# Patient Record
Sex: FEMALE | Race: WHITE
[De-identification: names, ages, dates, MRNs, and addresses within clinical notes are randomized per-mention and may not be internally consistent; named-entity substitution may affect disease eponyms.]

---

## 2017-04-09 ENCOUNTER — HOSPITAL ENCOUNTER (EMERGENCY)
Dept: HOSPITAL 62 - ER | Age: 67
Discharge: HOME | End: 2017-04-09
Payer: MEDICARE

## 2017-04-09 VITALS — DIASTOLIC BLOOD PRESSURE: 62 MMHG | SYSTOLIC BLOOD PRESSURE: 144 MMHG

## 2017-04-09 DIAGNOSIS — R11.2: ICD-10-CM

## 2017-04-09 DIAGNOSIS — D72.819: ICD-10-CM

## 2017-04-09 DIAGNOSIS — Z87.891: ICD-10-CM

## 2017-04-09 DIAGNOSIS — R10.9: ICD-10-CM

## 2017-04-09 DIAGNOSIS — Z88.5: ICD-10-CM

## 2017-04-09 DIAGNOSIS — N12: ICD-10-CM

## 2017-04-09 DIAGNOSIS — R53.1: ICD-10-CM

## 2017-04-09 DIAGNOSIS — E11.9: ICD-10-CM

## 2017-04-09 DIAGNOSIS — E03.9: Primary | ICD-10-CM

## 2017-04-09 LAB
ALBUMIN SERPL-MCNC: 3.7 G/DL (ref 3.5–5)
ALP SERPL-CCNC: 52 U/L (ref 38–126)
ALT SERPL-CCNC: 45 U/L (ref 9–52)
ANION GAP SERPL CALC-SCNC: 12 MMOL/L (ref 5–19)
APPEARANCE UR: (no result)
AST SERPL-CCNC: 29 U/L (ref 14–36)
BASOPHILS # BLD AUTO: 0 10^3/UL (ref 0–0.2)
BASOPHILS NFR BLD AUTO: 0.4 % (ref 0–2)
BILIRUB DIRECT SERPL-MCNC: 0.2 MG/DL (ref 0–0.4)
BILIRUB SERPL-MCNC: 0.7 MG/DL (ref 0.2–1.3)
BILIRUB UR QL STRIP: NEGATIVE
BUN SERPL-MCNC: 13 MG/DL (ref 7–20)
CALCIUM: 8.8 MG/DL (ref 8.4–10.2)
CHLORIDE SERPL-SCNC: 108 MMOL/L (ref 98–107)
CO2 SERPL-SCNC: 24 MMOL/L (ref 22–30)
CREAT SERPL-MCNC: 0.69 MG/DL (ref 0.52–1.25)
EOSINOPHIL # BLD AUTO: 0 10^3/UL (ref 0–0.6)
EOSINOPHIL NFR BLD AUTO: 0.1 % (ref 0–6)
ERYTHROCYTE [DISTWIDTH] IN BLOOD BY AUTOMATED COUNT: 14.3 % (ref 11.5–14)
GLUCOSE SERPL-MCNC: 169 MG/DL (ref 75–110)
GLUCOSE UR STRIP-MCNC: >=500 MG/DL
HCT VFR BLD CALC: 39.8 % (ref 36–47)
HGB BLD-MCNC: 13.2 G/DL (ref 12–15.5)
HGB HCT DIFFERENCE: -0.2
KETONES UR STRIP-MCNC: NEGATIVE MG/DL
LIPASE SERPL-CCNC: 335.2 U/L (ref 23–300)
LYMPHOCYTES # BLD AUTO: 1.3 10^3/UL (ref 0.5–4.7)
LYMPHOCYTES NFR BLD AUTO: 45.9 % (ref 13–45)
MAGNESIUM SERPL-MCNC: 1.7 MG/DL (ref 1.6–2.3)
MCH RBC QN AUTO: 28.1 PG (ref 27–33.4)
MCHC RBC AUTO-ENTMCNC: 33.2 G/DL (ref 32–36)
MCV RBC AUTO: 85 FL (ref 80–97)
MONOCYTES # BLD AUTO: 0.3 10^3/UL (ref 0.1–1.4)
MONOCYTES NFR BLD AUTO: 10.9 % (ref 3–13)
NEUTROPHILS # BLD AUTO: 1.2 10^3/UL (ref 1.7–8.2)
NEUTS SEG NFR BLD AUTO: 42.7 % (ref 42–78)
NITRITE UR QL STRIP: POSITIVE
PH UR STRIP: 5 [PH] (ref 5–9)
POTASSIUM SERPL-SCNC: 3.8 MMOL/L (ref 3.6–5)
PROT SERPL-MCNC: 6.6 G/DL (ref 6.3–8.2)
PROT UR STRIP-MCNC: 30 MG/DL
RBC # BLD AUTO: 4.7 10^6/UL (ref 3.72–5.28)
SODIUM SERPL-SCNC: 144.2 MMOL/L (ref 137–145)
SP GR UR STRIP: 1.03
TSH SERPL-ACNC: 0.1 UIU/ML (ref 0.47–4.68)
UROBILINOGEN UR-MCNC: NEGATIVE MG/DL (ref ?–2)
WBC # BLD AUTO: 2.9 10^3/UL (ref 4–10.5)

## 2017-04-09 PROCEDURE — S0119 ONDANSETRON 4 MG: HCPCS

## 2017-04-09 PROCEDURE — 83605 ASSAY OF LACTIC ACID: CPT

## 2017-04-09 PROCEDURE — 36415 COLL VENOUS BLD VENIPUNCTURE: CPT

## 2017-04-09 PROCEDURE — 81001 URINALYSIS AUTO W/SCOPE: CPT

## 2017-04-09 PROCEDURE — 80053 COMPREHEN METABOLIC PANEL: CPT

## 2017-04-09 PROCEDURE — 84443 ASSAY THYROID STIM HORMONE: CPT

## 2017-04-09 PROCEDURE — 84439 ASSAY OF FREE THYROXINE: CPT

## 2017-04-09 PROCEDURE — 83690 ASSAY OF LIPASE: CPT

## 2017-04-09 PROCEDURE — 85025 COMPLETE CBC W/AUTO DIFF WBC: CPT

## 2017-04-09 PROCEDURE — 83735 ASSAY OF MAGNESIUM: CPT

## 2017-04-09 PROCEDURE — 99283 EMERGENCY DEPT VISIT LOW MDM: CPT

## 2017-04-09 NOTE — ER DOCUMENT REPORT
ED Medical Screen (RME)





- General


Chief Complaint: General Weakness


Stated Complaint: BACK PAIN


TRAVEL OUTSIDE OF THE [place] IN LAST 30 DAYS: No





- HPI


Patient complains to provider of: generalized weakness


Notes: 





04/09/17 11:57


Generalweakness for a few days nausea vomiting patient has a history of 

hypothyroid did not take her Synthroid a day patient has urinalysis performed 

showing positive nitrates.  Denies fevers patient did get a flu shot this year





- Related Data


Allergies/Adverse Reactions: 


 





morphine Allergy (Verified 04/09/17 11:47)


 











Past Medical History


Renal/ Medical History: Denies: Hx Peritoneal Dialysis





Review of Systems





- Review of Systems


Constitutional: Weakness





Physical Exam





- Vital signs


Vitals: 





 











Temp Pulse Resp BP Pulse Ox


 


 97.7 F   77   16   118/93 H  100 


 


 04/09/17 11:49  04/09/17 11:49  04/09/17 11:49  04/09/17 11:49  04/09/17 11:49














- Cardiovascular


Rhythm: Regular


Heart sounds: Normal auscultation





Course





- Vital Signs


Vital signs: 





 











Temp Pulse Resp BP Pulse Ox


 


 97.7 F   77   16   118/93 H  100 


 


 04/09/17 11:49  04/09/17 11:49  04/09/17 11:49  04/09/17 11:49  04/09/17 11:49

## 2017-04-09 NOTE — ER DOCUMENT REPORT
ED General





- General


Chief Complaint: General Weakness


Stated Complaint: BACK PAIN


Mode of Arrival: Ambulatory


Information source: Patient


Notes: 


67-year-old female presents with complaints of right flank pain generalized 

weakness nausea vomiting and feeling warm over the past few days.  Patient 

denies any specific fever.  Patient was seen by primary care physician noted to 

have a UTI and sent in for further evaluation


TRAVEL OUTSIDE OF THE [place] IN LAST 30 DAYS: No





- HPI


Onset: Yesterday - Three-day duration


Onset/Duration: Persistent


Quality of pain: Achy


Severity: Mild


Pain Level: 1


Associated symptoms: Nausea, Vomiting, Weakness


Exacerbated by: Denies


Relieved by: Denies


Similar symptoms previously: No


Recently seen / treated by doctor: No





- Related Data


Allergies/Adverse Reactions: 


 





morphine Allergy (Verified 17 12:39)


 











Past Medical History





- Social History


Smoking Status: Former Smoker


Cigarette use (# per day): No


Chew tobacco use (# tins/day): No


Smoking Education Provided: No


Frequency of alcohol use: None


Drug Abuse: None


Family History: Reviewed & Not Pertinent


Patient has suicidal ideation: No


Patient has homicidal ideation: No


Endocrine Medical History: Reports: Hx Diabetes Mellitus Type 2


Renal/ Medical History: Denies: Hx Peritoneal Dialysis


Past Surgical History: Reports: Hx  Section - x3, Hx Hysterectomy





Review of Systems





- Review of Systems


Notes: 


REVIEW OF SYSTEMS:


CONSTITUTIONAL :  Denies fever,  chills, or sweats.  Denies recent illness.


EENT:   Denies eye, ear, throat, or mouth pain or symptoms.  Denies nasal or 

sinus congestion or discharge.  Denies throat, tongue, or mouth swelling or 

difficulty swallowing.


CARDIOVASCULAR:  Denies chest pain.  Denies palpitations or racing or irregular 

heart beat.  Denies ankle edema.


RESPIRATORY:  Denies cough, cold, or chest congestion.  Denies shortness of 

breath, difficulty breathing, or wheezing.


GASTROINTESTINAL: Admits to nausea vomiting or flank pain


GENITOURINARY:  Denies difficulty urinating, painful urination, burning, 

frequency, blood in urine, or discharge.


FEMALE  GENITOURINARY:  Denies vaginal bleeding, heavy or abnormal periods, 

irregular periods.  Denies vaginal discharge or odor. 


MUSCULOSKELETAL:  Denies back or neck pain or stiffness.  Denies joint pain or 

swelling.


SKIN:   Denies rash, lesions or sores.


HEMATOLOGIC :   Denies easy bruising or bleeding.


LYMPHATIC:  Denies swollen, enlarged glands.


NEUROLOGICAL: Admits to generalized weakness


PSYCHIATRIC:  Denies anxiety or stress.  Denies depression, suicidal ideation, 

or homicidal ideation.





ALL OTHER SYSTEMS REVIEWED AND NEGATIVE.








Dictation was performed using Dragon voice recognition software 








PHYSICAL EXAMINATION:





GENERAL: Well-appearing, well-nourished and in no acute distress.





HEAD: Atraumatic, normocephalic.





EYES: Pupils equal round and reactive to light, extraocular movements intact, 

conjunctiva are normal.





ENT: Nares patent, oropharynx clear without exudates.  Moist mucous membranes.





NECK: Normal range of motion, supple without lymphadenopathy





LUNGS: Breath sounds clear to auscultation bilaterally and equal.  No wheezes 

rales or rhonchi.





HEART: Regular rate and rhythm without murmurs





ABDOMEN: Soft, nontender, nondistended abdomen.  No guarding, no rebound.  No 

masses appreciated.  Mild right CVA tenderness





Female : deferred





Musculoskeletal: Normal range of motion, no pitting or edema.  No cyanosis.





NEUROLOGICAL: Cranial nerves grossly intact.  Normal speech, normal gait.  

Normal sensory, motor exams





PSYCH: Normal mood, normal affect.





SKIN: Warm, Dry, normal turgor, no rashes or lesions noted.





Physical Exam





- Vital signs


Vitals: 


 











Temp Pulse Resp BP Pulse Ox


 


 97.7 F   77   16   118/93 H  100 


 


 17 11:49  17 11:49  17 11:49  17 11:49  17 11:49














Course





- Re-evaluation


Re-evalutation: 





17 15:11


Urinalysis is consistent with a UTI, patient's laboratory otherwise notes mild 

leukopenia TSH was noted to be low as well.  I believe the patient has 

pyelonephritis looks well.  Patient will be started on antibiotics given nausea 

control and very strict return precautions.  Patient states she understands and 

will do so and is very happy to be discharged











After performing a Medical Screening Examination, I estimate there is LOW risk 

for ACUTE APPENDICITIS, BOWEL OBSTRUCTION, ACUTE CHOLECYSTITIS, PERFORATED 

DIVERTICULITIS, INCARCERATED HERNIA, PANCREATITIS, PELVIC INFLAMMATORY DISEASE, 

PERFORATED ULCER, ECTOPIC PREGNANCY, or TUBO-OVARIAN ABSCESS, thus I consider 

the discharge disposition reasonable. Also, there is no evidence or peritonitis

, sepsis, or toxicity. The patient and I have discussed the diagnosis and risks

, and we agree with discharging home with close follow-up with the 

understanding that symptoms and presentations can change. We also discussed 

returning to the Emergency Department immediately if new or worsening symptoms 

occur. We have discussed the symptoms which are most concerning (e.g., bloody 

stool, fever, changing or worsening pain, vomiting) that necessitate immediate 

return.





- Vital Signs


Vital signs: 


 











Temp Pulse Resp BP Pulse Ox


 


 98.7 F   70   19   144/62 H  94 


 


 17 13:39  17 13:39  17 13:39  17 13:39  17 13:39














- Laboratory


Result Diagrams: 


 17 12:05





 17 12:05


Laboratory results interpreted by me: 


 











  17





  12:05 12:05 12:05


 


WBC  2.9 L  


 


RDW  14.3 H  


 


Plt Count  103 L  


 


Lymphocytes %  45.9 H  


 


Absolute Neutrophils  1.2 L  


 


Chloride   108 H 


 


Glucose   169 H 


 


Lipase   335.2 H 


 


TSH    0.10 L


 


Urine Protein   


 


Urine Glucose (UA)   


 


Urine Nitrite   


 


Ur Leukocyte Esterase   














  17





  12:05


 


WBC 


 


RDW 


 


Plt Count 


 


Lymphocytes % 


 


Absolute Neutrophils 


 


Chloride 


 


Glucose 


 


Lipase 


 


TSH 


 


Urine Protein  30 H


 


Urine Glucose (UA)  >=500 H


 


Urine Nitrite  POSITIVE H


 


Ur Leukocyte Esterase  MODERATE H














Discharge





- Discharge


Clinical Impression: 


 Pyelonephritis, Weakness, Secondary hypothyroidism





Condition: Stable


Disposition: HOME, SELF-CARE


Instructions:  Pyelonephritis ([facility])


Prescriptions: 


Ciprofloxacin HCl [Cipro 500 mg Tablet] 500 mg PO BID #20 tablet


Ondansetron HCl [Zofran] 8 mg PO Q6 #14 tablet


Referrals: 


UMER VAZQUEZ DO [Primary Care Provider] - Follow up tomorrow

## 2017-05-11 ENCOUNTER — HOSPITAL ENCOUNTER (OUTPATIENT)
Dept: HOSPITAL 62 - RAD | Age: 67
End: 2017-05-11
Payer: MEDICARE

## 2017-05-11 DIAGNOSIS — I25.10: Primary | ICD-10-CM

## 2017-05-11 PROCEDURE — A9500 TC99M SESTAMIBI: HCPCS

## 2017-05-11 PROCEDURE — 93017 CV STRESS TEST TRACING ONLY: CPT

## 2017-05-11 PROCEDURE — 78452 HT MUSCLE IMAGE SPECT MULT: CPT

## 2017-05-11 NOTE — NON-INVASIVE CARDIOLOGY (SQ)
INTRAVENOUS LEXISCAN CARDIOLITE STRESS TEST USING SINGLE PHOTON EMMISION 
COMPUTERIZED TOMOGRAPHIC.



DATE OF PROCEDURE: May 11, 2017



INDICATION : Coronary artery disease



CARDIAC RISK FACTORS: Diabetes, hypertension, dyslipidemia, family history of 
CAD



RESTING EKG: Sinus rhythm without any baseline ST-T wave changes



STRESS EKG: No significant changes noted with LexiScan bolus 



REASON FOR TERMINATION: Protocol.





PROCEDURE REPORT: Baseline heart rate 70 beats per minute with blood pressure 
of 127/66.  Patient had no significant complaints.  Heart rate at 2 minutes 
post bolus 79 with a blood pressure of 120/47.  3 minutes post bolus heart rate 
76 with blood pressure of 126/49.  No significant EKG changes were noted.  
Patient had no significant complaints during the procedure or postprocedure. 

Patient injected with Aminophyllin 75 mg at 3 minutes or later after Lexiscan 
bolus.



CONCLUSIONS: Normal EKG and hemodynamic response to IV LexiScan.







NUCLEAR DATA:

At rest the patient was given 13.88 millicuries of technetium 99 sestamibi 
injected intravenously.  As per protocol rest gated SPECT images were obtained.
  Subsequently the patient was given intravenous LexiScan at a dose of 0.4 mg 
in 5 mL intravenously, followed by flush with normal saline.  Subsequently the 
stress dose of 41.6 millicuries of technetium 99 sestamibi was injected 
intravenously.  As per protocol stress gated images were obtained.  



NUCLEAR INTERPRETATION: Both raw and processed data were used for 
interpretation.  Visual, qualitative, computer-generated quantitative data was 
used.  There was good myocardial uptake of technetium compound.  Motion 
artifact and soft tissue attenuations were noted.  Increased visceral uptake 
was noted.  Mild decreased uptake noted in the LV apex felt to be related to 
normal apical thinning.  Cannot rule out a small area of prior mild apical scar 
but no corresponding wall motion abnormalities were noted No definitive areas 
of transient perfusion defect noted.  No definitive areas of fixed perfusion 
defect or scars noted.



EKG gated imaging showed LV EF at 52 %, rest and stress gated EF similar 
visually.  T. I D. ratio was 1.01.  Lung heart ratio noted to be within normal 
limits 0.34.  No significant extracardiac and abnormal radiotracer activities 
were noted.  RV free wall uptake was noted to be borderline increased.



IMPRESSION: Also refer to comments under nuclear interpretation. Also test 
results needs to be interpreted in the context of pretest probability.





1.  There is no definitive scintigraphic evidence of LexiScan induced 
myocardial ischemia.

2.  There is no definitive scintigraphic evidence of myocardial infarction/scar.

3.  EKG gated imaging shows left ejection fraction of approximately 52 %. 

4.  Clinical correlation requested as occasionally single vessel disease or 
balanced ischemia could be missed. In approximately 10% of the cases Lexiscan 
may not cause adequate vasodilatory stress.



RECOMMENDATIONS:

Aggressive risk factor modification, medical therapy.

Clinical correlation with echocardiogram derived ejection fraction.

Inability to exercise by itself can lead to increased cardiovascular event 
risks.











Amanda Villar M.D., [facility]

Consultant cardiologist,

Board certified in cardiovascular diseases, 

Nuclear cardiology, 

Echocardiography

Cardiac CT and cardiac MRI

Ph. [phone] 

Fax [phone]
[facility]

## 2017-08-06 ENCOUNTER — HOSPITAL ENCOUNTER (EMERGENCY)
Dept: HOSPITAL 62 - ER | Age: 67
Discharge: HOME | End: 2017-08-06
Payer: MEDICARE

## 2017-08-06 VITALS — SYSTOLIC BLOOD PRESSURE: 174 MMHG | DIASTOLIC BLOOD PRESSURE: 88 MMHG

## 2017-08-06 DIAGNOSIS — K02.9: ICD-10-CM

## 2017-08-06 DIAGNOSIS — I10: ICD-10-CM

## 2017-08-06 DIAGNOSIS — E11.9: ICD-10-CM

## 2017-08-06 DIAGNOSIS — R22.0: ICD-10-CM

## 2017-08-06 DIAGNOSIS — K04.7: Primary | ICD-10-CM

## 2017-08-06 DIAGNOSIS — I25.10: ICD-10-CM

## 2017-08-06 DIAGNOSIS — Z88.5: ICD-10-CM

## 2017-08-06 PROCEDURE — 99283 EMERGENCY DEPT VISIT LOW MDM: CPT

## 2017-08-06 PROCEDURE — 0H91XZZ DRAINAGE OF FACE SKIN, EXTERNAL APPROACH: ICD-10-PCS | Performed by: EMERGENCY MEDICINE

## 2017-08-06 PROCEDURE — 10060 I&D ABSCESS SIMPLE/SINGLE: CPT

## 2017-08-06 NOTE — ER DOCUMENT REPORT
ED General





- General


Chief Complaint: Facial Swelling


Stated Complaint: JAW PAIN


Time Seen by Provider: 17 10:09


Mode of Arrival: Ambulatory


Information source: Patient


Notes: 


67-year-old female history of diabetes coronary artery disease very poor 

dentition presents with complaints of left facial swelling.  Patient notes 

symptoms started over the past 3 days.  Patient notes her tooth was hurting on 

the left.  Denies a history of previous abscesses.  Patient denies any fevers 

or chills, difficulty breathing 


TRAVEL OUTSIDE OF THE [place] IN LAST 30 DAYS: No





- HPI


Onset: Last week


Onset/Duration: Persistent, Worse


Quality of pain: Achy


Severity: Moderate


Pain Level: 2


Associated symptoms: Other


Exacerbated by: Food


Relieved by: Denies


Similar symptoms previously: No


Recently seen / treated by doctor: No





- Related Data


Allergies/Adverse Reactions: 


 





morphine Allergy (Verified 17 10:08)


 











Past Medical History





- Social History


Smoking Status: Never Smoker


Cigarette use (# per day): No


Chew tobacco use (# tins/day): No


Smoking Education Provided: No


Frequency of alcohol use: None


Drug Abuse: None


Family History: Reviewed & Not Pertinent


Patient has suicidal ideation: No


Patient has homicidal ideation: No





- Past Medical History


Cardiac Medical History: Reports: Hx Hypertension


Endocrine Medical History: Reports: Hx Diabetes Mellitus Type 2


Renal/ Medical History: Denies: Hx Peritoneal Dialysis


Past Surgical History: Reports: Hx  Section - x3, Hx Hysterectomy





Review of Systems





- Review of Systems


Notes: 


REVIEW OF SYSTEMS:


CONSTITUTIONAL :  Denies fever,  chills, or sweats.  Denies recent illness.


EENT:   Admits to dental pain facial swelling


CARDIOVASCULAR:  Denies chest pain.  Denies palpitations or racing or irregular 

heart beat.  Denies ankle edema.


RESPIRATORY:  Denies cough, cold, or chest congestion.  Denies shortness of 

breath, difficulty breathing, or wheezing.


GASTROINTESTINAL:  Denies abdominal pain or distention.  Denies nausea, vomiting

, or diarrhea.  Denies blood in vomitus, stools, or per rectum.  Denies black, 

tarry stools.  Denies constipation. 


GENITOURINARY:  Denies difficulty urinating, painful urination, burning, 

frequency, blood in urine, or discharge.


FEMALE  GENITOURINARY:  Denies vaginal bleeding, heavy or abnormal periods, 

irregular periods.  Denies vaginal discharge or odor. 


MUSCULOSKELETAL:  Denies back or neck pain or stiffness.  Denies joint pain or 

swelling.


SKIN:   Denies rash, lesions or sores.


HEMATOLOGIC :   Denies easy bruising or bleeding.


LYMPHATIC:  Denies swollen, enlarged glands.


NEUROLOGICAL:  Denies confusion or altered mental status.  Denies passing out 

or loss of consciousness.  Denies dizziness or lightheadedness.  Denies 

headache.  Denies weakness or paralysis or loss of use of either side.  Denies 

problems with gait or speech.  Denies sensory loss, numbness, or tingling.  

Denies seizures.


PSYCHIATRIC:  Denies anxiety or stress.  Denies depression, suicidal ideation, 

or homicidal ideation.





ALL OTHER SYSTEMS REVIEWED AND NEGATIVE.











PHYSICAL EXAMINATION:





GENERAL: Well-appearing, well-nourished and in no acute distress.





HEAD: Facial edema noted tender fluctuant just lateral to tooth number 19





EYES: Pupils equal round and reactive to light, extraocular movements intact, 

conjunctiva are normal.





ENT: left facial edema, poor dentition gum around missing tooth 19 swollen





NECK: Normal range of motion, supple without lymphadenopathy





LUNGS: Breath sounds clear to auscultation bilaterally and equal.  No wheezes 

rales or rhonchi.





HEART: Regular rate and rhythm without murmurs





ABDOMEN: Soft, nontender, nondistended abdomen.  No guarding, no rebound.  No 

masses appreciated.





Female : deferred





Musculoskeletal: Normal range of motion, no pitting or edema.  No cyanosis.





NEUROLOGICAL: Cranial nerves grossly intact.  Normal speech, normal gait.  

Normal sensory, motor exams





PSYCH: Normal mood, normal affect.





SKIN: Warm, Dry, normal turgor, no rashes or lesions noted.

















Dictation was performed using Dragon voice recognition software





Physical Exam





- Vital signs


Vitals: 


 











Temp Pulse Resp BP Pulse Ox


 


 98 F   60   16   186/64 H  97 


 


 17 10:04  17 10:04  17 10:04  17 10:04  17 10:04














Course





- Re-evaluation


Re-evalutation: 


17 10:29


pt has obvious swelling around an infected decayed tooth. 





17 10:54


area incise, drained, no complications. pt placed cammie ntibtiocs given very 

strict return precautaitons


no signs of airway compromise, eros angina at this time 








After performing a Medical Screening Examination, I estimate there is LOW risk 

for a DEEP SPACE INFECTION (e.g., EROS'S ANGINA OR RETROPHARYNGEAL ABSCESS), 

MENINGITIS, INTRACRANIAL HEMORRHAGE, or AIRWAY COMPROMISE, thus I consider the 

discharge disposition reasonable. Also, there is no evidence or peritonitis, 

sepsis, or toxicity.  I have reevaluated this patient multiple times and no 

significant life threatening changes are noted. The patient and I have 

discussed the diagnosis and risks, and we agree with discharging home with 

close follow-up with the understanding that symptoms and presentations can 

change. We also discussed returning to the Emergency Department immediately if 

new or worsening symptoms occur. We have discussed the symptoms which are most 

concerning (e.g., changing or worsening pain, trouble swallowing or breathing, 

neck stiffness or fever) that necessitate immediate return.





- Vital Signs


Vital signs: 


 











Temp Pulse Resp BP Pulse Ox


 


 98 F   60   16   186/64 H  97 


 


 17 10:04  17 10:04  17 10:04  17 10:04  17 10:04














Procedures





- Incision and Drainage


  ** Left Lower Face


Time completed: 10:50


Type: Simple


Anesthetic type: 0.5% Bupivacaine


mL's of anesthetic: 10


Blade size: 11


I&D procedure: Sterile dressing applied


Incision Method: Incision made by scalpel


Amount/type of drainage: small pus with large blood 





- Additional Procedures


  ** inferior alveolar nerve block


Time performed: 10:45 - using 10cc of 0.5% bupivicaine iwth ocmplete releif no 

complication 





Discharge





- Discharge


Clinical Impression: 


 Dental abscess, Facial swelling





Condition: Stable


Disposition: HOME, SELF-CARE


Instructions:  Abscess ([facility]), Post Incision and Drainage


Additional Instructions: 


Follow up with your physician tomorrow for further care or return to the ED 

IMMEDIATELY if symptoms worsen or new concerns occur. If you cannot afford to 

follow up with your primary care physician a list of low cost clinics have been 

provided at the end of your discharge papers as well.


Prescriptions: 


Clindamycin HCl 300 mg PO Q6 #40 capsule

## 2018-02-28 ENCOUNTER — HOSPITAL ENCOUNTER (EMERGENCY)
Dept: HOSPITAL 62 - ER | Age: 68
Discharge: OTHER INSTITUTION | End: 2018-02-28
Payer: MEDICARE

## 2018-02-28 VITALS — SYSTOLIC BLOOD PRESSURE: 149 MMHG | DIASTOLIC BLOOD PRESSURE: 68 MMHG

## 2018-02-28 DIAGNOSIS — M79.651: ICD-10-CM

## 2018-02-28 DIAGNOSIS — M54.5: ICD-10-CM

## 2018-02-28 DIAGNOSIS — M46.1: Primary | ICD-10-CM

## 2018-02-28 DIAGNOSIS — M54.9: ICD-10-CM

## 2018-02-28 LAB
APPEARANCE UR: CLEAR
APTT PPP: YELLOW S
BILIRUB UR QL STRIP: NEGATIVE
GLUCOSE UR STRIP-MCNC: >=500 MG/DL
KETONES UR STRIP-MCNC: (no result) MG/DL
NITRITE UR QL STRIP: NEGATIVE
PH UR STRIP: 5 [PH] (ref 5–9)
PROT UR STRIP-MCNC: 100 MG/DL
SP GR UR STRIP: 1.02
UROBILINOGEN UR-MCNC: NEGATIVE MG/DL (ref ?–2)

## 2018-02-28 PROCEDURE — 96372 THER/PROPH/DIAG INJ SC/IM: CPT

## 2018-02-28 PROCEDURE — 99284 EMERGENCY DEPT VISIT MOD MDM: CPT

## 2018-02-28 PROCEDURE — 72110 X-RAY EXAM L-2 SPINE 4/>VWS: CPT

## 2018-02-28 PROCEDURE — 81001 URINALYSIS AUTO W/SCOPE: CPT

## 2018-02-28 NOTE — RADIOLOGY REPORT (SQ)
EXAM DESCRIPTION:  L SPINE WHOLE



COMPLETED DATE/TIME:  2/28/2018 7:42 am



REASON FOR STUDY:  Back pain



COMPARISON:  None.



NUMBER OF VIEWS:  Five views including obliques.



TECHNIQUE:  AP, lateral, oblique, and sacral radiographic images acquired of the lumbar spine.



LIMITATIONS:  None.



FINDINGS:  MINERALIZATION: Normal.

SEGMENTATION: Normal.  No transitional anatomy.

ALIGNMENT: Normal.

VERTEBRAE: Maintained height.  No fracture or worrisome bone lesion.

DISCS: Mild disc space loss of height at L5-S1

POSTERIOR ELEMENTS: Mild bilateral facet joint space narrowing and bony spurring at L5-S1.

HARDWARE: None in the spine.

PARASPINAL SOFT TISSUES: Normal.

PELVIS: Not included in the field of view.  SI joints are unremarkable.

OTHER: No other significant finding.



IMPRESSION:  Mild degenerative changes lower lumbar spine



TECHNICAL DOCUMENTATION:  JOB ID:  [id number]

 [employer]- All Rights Reserved



Reading location - IP/workstation name: [email or web address]

## 2018-02-28 NOTE — ER DOCUMENT REPORT
ED General





- General


Chief Complaint: Back Pain


Stated Complaint: LOW BACK PAIN


Time Seen by Provider: 18 07:12


Mode of Arrival: Ambulatory


Information source: Patient


Notes: 





68 years old female presents today with pain over the lower back for the last 

few days more so this morning.  The pain is centered mostly on the right lower 

back but also in the left lower back.  More intense pain on the right lower 

back and radiates to the middle part of the thigh.  Had no difficulty in 

walking.  Denies any numbness tingling sensation or weakness over the lower 

extremities.  No injuries.  Was not lifting any heavy objects.  Did not have 

similar symptoms before.  Denies any abdominal pain nausea vomiting diarrhea 

dysuria or frequency.  Denies any flank pain.  Denies any other constitutional 

symptoms.


TRAVEL OUTSIDE OF THE [place] IN LAST 30 DAYS: No





- HPI


Onset: Yesterday


Onset/Duration: Gradual


Quality of pain: Achy, Sharp.  denies: No pain, Burning, Cramping, Dull, 

Fullness, Pressure, Stabbing, Throbbing, Other


Associated symptoms: denies: None, Allergy/hay fever, Body/muscle aches, Chest 

pain, Chills, Nonproductive cough, Productive cough, Diarrhea, Drooling, Earache

, Fever, Headache, Hoarseness, Hurts to breath, Leg swelling, Nausea, Vomiting, 

Rhinnorhea, Sinus pain/drainage, Shortness of breath, Slow to respond, Sore 

throat, Sweating, Weakness, Other


Exacerbated by: Movement, Walking.  denies: Denies, Supine, Sitting, Standing, 

Coughing, Deep breathing, Food, Other


Relieved by: denies: Denies, Supine, Sitting, Standing, Remaining still, 

Antacids, Food, Other





- Related Data


Allergies/Adverse Reactions: 


 





morphine Allergy (Verified 17 10:08)


 











Past Medical History





- General


Information source: Parent





- Social History


Smoking Status: Never Smoker


Chew tobacco use (# tins/day): No


Frequency of alcohol use: None


Drug Abuse: None


Family History: Reviewed & Not Pertinent


Patient has suicidal ideation: No


Patient has homicidal ideation: No





- Past Medical History


Cardiac Medical History: Reports: Hx Hypertension


Endocrine Medical History: Reports: Hx Diabetes Mellitus Type 2


Renal/ Medical History: Denies: Hx Peritoneal Dialysis


Past Surgical History: Reports: Hx  Section - x3, Hx Hysterectomy





Review of Systems





- Review of Systems


Constitutional: denies: No symptoms reported, See HPI, Chills, Diaphoresis, 

Fever, Malaise, Weakness, Other, Weight gain, Weight loss, Recent illness


EENT: denies: No symptoms reported, See HPI, Eye pain, Eye discharge, Blurred 

vision, Tearing, Double vision, Ear pain, Ear discharge, Nose pain, Nose 

congestion, Nose discharge, Sinus pressure, Sinus discharge, Throat pain, 

Difficulty swallowing, Throat swelling, Mouth pain, Mouth swelling, Dental 

problem, Vertigo, Other


Cardiovascular: denies: No symptoms reported, See HPI, Chest pain, Palpitations

, Heart racing, Orthopnea, Dyspnea, Syncope, Dizziness, Lightheaded, Edema, 

Other, Paroxysmal Nocturnal Dysp


Respiratory: denies: No symptoms reported, See HPI, Cough, Hurts to breathe, 

Hemoptysis, Short of breath, Sputum, Stridor, Wheezing, Other


Gastrointestinal: denies: No symptoms reported, See HPI, Abdomen distended, 

Abdominal pain, Diarrhea, Nausea, Vomiting, Constipation, Blood streaked bowels

, Poor appetite, Poor fluid intake, Blood in vomit, Black stools, Rectal 

bleeding, Last bowel movement, Fecal incontinence, Other


Genitourinary: denies: No symptoms reported, See HPI, Burning, Dysuria, 

Discharge, Frequency, Flank pain, Hematuria, Incontinence, Pain, Urgency, 

Retention, Other


Female Genitourinary: denies: No symptoms reported, See HPI, Last menstrual 

period, Pregnant, Post menopausal, Heavy/abnormal periods, Irregular period, 

Vaginal bleeding, Vaginal discharge, Vaginal odor, Painful intercourse, Other


Musculoskeletal: Back pain


Skin: denies: No symptoms reported, See HPI, Change in color, Change in hair/

nails, Dryness, Lesions, Lumps, Rash, Other


Hematologic/Lymphatic: denies: No symptoms reported, See HPI, Anemia, Blood 

clots, Easy bleeding, Easy bruising, Enlarged lymph nodes, Swollen glands, Other


Neurological/Psychological: denies: No symptoms reported, See HPI, Confusion, 

Dementia, Depression, Hallucinations, Anxiety, Homicidal ideation, Sensory 

change, Weakness, Gait changes, Loss of power, Paralysis, Seizure, Lost 

consciousness, Headaches, Speech impairment, Numbness, Suicidal ideation, 

Tingling, Tremor, Other





Physical Exam





- Notes


Notes: 





PHYSICAL EXAMINATION:





GENERAL: Well-appearing, well-nourished and mild to moderate discomfort, obesity





HEAD: Atraumatic, normocephalic.





EYES: Pupils equal round and reactive to light, extraocular movements intact, 

conjunctiva are normal.





ENT: Nares patent, oropharynx clear without exudates.  Moist mucous membranes.





NECK: Normal range of motion, supple without lymphadenopathy





LUNGS: Breath sounds clear to auscultation bilaterally and equal.  No wheezes 

rales or rhonchi.





HEART: Regular rate and rhythm without murmurs





ABDOMEN: Soft, nontender, nondistended abdomen.  No guarding, no rebound.  No 

masses appreciated.





Female : deferred





Musculoskeletal: Right lower back at the L5-S1 region slight tenderness were 

noted, gets more tender over the right sacroiliac joint region.  And also 

moderate tenderness over the left sacroiliac joint region.  Neurovascular 

function distally was within normal limits.





NEUROLOGICAL: Cranial nerves grossly intact.  Normal speech, normal gait.  

Normal sensory, motor exams





PSYCH: Normal mood, normal affect.





SKIN: Warm, Dry, normal turgor, no rashes or lesions noted.





Course





- Re-evaluation


Re-evalutation: 





18 08:26


Was given Toradol IM as well as Valium, with clinical improvement discharge home


18 08:26








- Laboratory


Laboratory results interpreted by me: 


 











  18





  07:00


 


Urine Protein  100 H


 


Urine Glucose (UA)  >=500 H


 


Urine Ketones  TRACE H














Discharge





- Discharge


Clinical Impression: 


 Bilateral sacroiliitis





Lower back pain


Qualifiers:


 Chronicity: acute Back pain laterality: unspecified Sciatica presence: without 

sciatica Qualified Code(s): M54.5 - Low back pain





Condition: Fair


Disposition: ADMITTED AS INPATIENT


Instructions:  Oral Narcotic Medication ([facility]), Low Back Pain ([facility])


Prescriptions: 


Hydrocodone/Acetaminophen [Vicodin 5-300 mg Tablet] 1 each PO Q6HP PRN #20 

tablet


 PRN Reason: 


Ketorolac Tromethamine [Toradol 10 mg Tablet] 10 mg PO Q6HP PRN #20 tablet


 PRN Reason: 


Diazepam [Valium 2 mg Tablet] 2 mg PO BID PRN #14 tablet


 PRN Reason: 


Referrals: 


UMER VAZQUEZ,  [Primary Care Provider] - Follow up as needed

## 2020-11-16 ENCOUNTER — HOSPITAL ENCOUNTER (OUTPATIENT)
Dept: HOSPITAL 62 - ER | Age: 70
Setting detail: OBSERVATION
LOS: 2 days | Discharge: HOME | End: 2020-11-18
Attending: NURSE PRACTITIONER | Admitting: STUDENT IN AN ORGANIZED HEALTH CARE EDUCATION/TRAINING PROGRAM
Payer: MEDICARE

## 2020-11-16 DIAGNOSIS — Z79.899: ICD-10-CM

## 2020-11-16 DIAGNOSIS — R11.2: ICD-10-CM

## 2020-11-16 DIAGNOSIS — E11.65: ICD-10-CM

## 2020-11-16 DIAGNOSIS — Z88.8: ICD-10-CM

## 2020-11-16 DIAGNOSIS — I25.10: ICD-10-CM

## 2020-11-16 DIAGNOSIS — J12.89: ICD-10-CM

## 2020-11-16 DIAGNOSIS — Z79.4: ICD-10-CM

## 2020-11-16 DIAGNOSIS — J96.01: ICD-10-CM

## 2020-11-16 DIAGNOSIS — E66.9: ICD-10-CM

## 2020-11-16 DIAGNOSIS — I10: ICD-10-CM

## 2020-11-16 DIAGNOSIS — U07.1: Primary | ICD-10-CM

## 2020-11-16 LAB
ADD MANUAL DIFF: NO
ALBUMIN SERPL-MCNC: 3.5 G/DL (ref 3.5–5)
ALP SERPL-CCNC: 61 U/L (ref 38–126)
ANION GAP SERPL CALC-SCNC: 11 MMOL/L (ref 5–19)
AST SERPL-CCNC: 17 U/L (ref 14–36)
BASOPHILS # BLD AUTO: 0 10^3/UL (ref 0–0.2)
BASOPHILS NFR BLD AUTO: 0.1 % (ref 0–2)
BILIRUB DIRECT SERPL-MCNC: 0 MG/DL (ref 0–0.4)
BILIRUB SERPL-MCNC: 1.2 MG/DL (ref 0.2–1.3)
BUN SERPL-MCNC: 16 MG/DL (ref 7–20)
CALCIUM: 9.4 MG/DL (ref 8.4–10.2)
CHLORIDE SERPL-SCNC: 99 MMOL/L (ref 98–107)
CO2 SERPL-SCNC: 26 MMOL/L (ref 22–30)
EOSINOPHIL # BLD AUTO: 0.1 10^3/UL (ref 0–0.6)
EOSINOPHIL NFR BLD AUTO: 1.4 % (ref 0–6)
ERYTHROCYTE [DISTWIDTH] IN BLOOD BY AUTOMATED COUNT: 13.9 % (ref 11.5–14)
GLUCOSE SERPL-MCNC: 193 MG/DL (ref 75–110)
HCT VFR BLD CALC: 39.2 % (ref 36–47)
HGB BLD-MCNC: 13.4 G/DL (ref 12–15.5)
LYMPHOCYTES # BLD AUTO: 1.9 10^3/UL (ref 0.5–4.7)
LYMPHOCYTES NFR BLD AUTO: 22.9 % (ref 13–45)
MCH RBC QN AUTO: 28.1 PG (ref 27–33.4)
MCHC RBC AUTO-ENTMCNC: 34.1 G/DL (ref 32–36)
MCV RBC AUTO: 82 FL (ref 80–97)
MONOCYTES # BLD AUTO: 0.6 10^3/UL (ref 0.1–1.4)
MONOCYTES NFR BLD AUTO: 7.6 % (ref 3–13)
NEUTROPHILS # BLD AUTO: 5.6 10^3/UL (ref 1.7–8.2)
NEUTS SEG NFR BLD AUTO: 68 % (ref 42–78)
PLATELET # BLD: 324 10^3/UL (ref 150–450)
POTASSIUM SERPL-SCNC: 3.6 MMOL/L (ref 3.6–5)
PROT SERPL-MCNC: 6.8 G/DL (ref 6.3–8.2)
RBC # BLD AUTO: 4.77 10^6/UL (ref 3.72–5.28)
TOTAL CELLS COUNTED % (AUTO): 100 %
WBC # BLD AUTO: 8.3 10^3/UL (ref 4–10.5)

## 2020-11-16 PROCEDURE — 81001 URINALYSIS AUTO W/SCOPE: CPT

## 2020-11-16 PROCEDURE — 96375 TX/PRO/DX INJ NEW DRUG ADDON: CPT

## 2020-11-16 PROCEDURE — 80048 BASIC METABOLIC PNL TOTAL CA: CPT

## 2020-11-16 PROCEDURE — 86140 C-REACTIVE PROTEIN: CPT

## 2020-11-16 PROCEDURE — 85025 COMPLETE CBC W/AUTO DIFF WBC: CPT

## 2020-11-16 PROCEDURE — 80053 COMPREHEN METABOLIC PANEL: CPT

## 2020-11-16 PROCEDURE — 36415 COLL VENOUS BLD VENIPUNCTURE: CPT

## 2020-11-16 PROCEDURE — 82803 BLOOD GASES ANY COMBINATION: CPT

## 2020-11-16 PROCEDURE — 85027 COMPLETE CBC AUTOMATED: CPT

## 2020-11-16 PROCEDURE — 71045 X-RAY EXAM CHEST 1 VIEW: CPT

## 2020-11-16 PROCEDURE — G0378 HOSPITAL OBSERVATION PER HR: HCPCS

## 2020-11-16 PROCEDURE — 93010 ELECTROCARDIOGRAM REPORT: CPT

## 2020-11-16 PROCEDURE — 93005 ELECTROCARDIOGRAM TRACING: CPT

## 2020-11-16 PROCEDURE — 96365 THER/PROPH/DIAG IV INF INIT: CPT

## 2020-11-16 PROCEDURE — 99285 EMERGENCY DEPT VISIT HI MDM: CPT

## 2020-11-16 PROCEDURE — 87040 BLOOD CULTURE FOR BACTERIA: CPT

## 2020-11-16 PROCEDURE — 82728 ASSAY OF FERRITIN: CPT

## 2020-11-16 PROCEDURE — 83615 LACTATE (LD) (LDH) ENZYME: CPT

## 2020-11-16 PROCEDURE — 82550 ASSAY OF CK (CPK): CPT

## 2020-11-16 PROCEDURE — 96366 THER/PROPH/DIAG IV INF ADDON: CPT

## 2020-11-16 PROCEDURE — 82962 GLUCOSE BLOOD TEST: CPT

## 2020-11-16 PROCEDURE — 85379 FIBRIN DEGRADATION QUANT: CPT

## 2020-11-16 PROCEDURE — 84484 ASSAY OF TROPONIN QUANT: CPT

## 2020-11-16 NOTE — ER DOCUMENT REPORT
ED Medical Screen (RME)





- General


Chief Complaint: Nausea/Vomiting


Stated Complaint: NAUSEA/VOMITING


Time Seen by Provider: 20 19:43


Primary Care Provider: 


UMER VAZQUEZ DO [Primary Care Provider] - Follow up as needed


Mode of Arrival: Wheelchair


Information source: Patient


Notes: 





70-year-old female presented to ED for complaint of fatigue cough and vomiting 

when she coughs real hard.  She was tested positive for Covid on 3 November.  

She does have nausea medicine that she takes she takes Zofran.  Her son states 

that he was hesitant to bring her out here because she already has the nausea 

medicine and she knows she is Covid positive but he was convinced by other 

family members that she needed to come in to be checked out.  We will get blood 

and urine chest x-ray and she will be seen by another provider.  Cervical put 

back where you are and there get all the stuff going oka

















I have greeted and performed a rapid initial assessment of this patient.  A 

comprehensive ED assessment and evaluation of the patient, analysis of test 

results and completion of medical decision making process will be conducted by 

an additional ED providers.


TRAVEL OUTSIDE OF THE [place] IN LAST 30 DAYS: No





- Related Data


Allergies/Adverse Reactions: 


                                        





morphine Allergy (Verified 20 19:41)


   











Past Medical History





- Past Medical History


Cardiac Medical History: Reports: Hx Hypertension


Endocrine Medical History: Reports: Hx Diabetes Mellitus Type 2


Renal/ Medical History: Denies: Hx Peritoneal Dialysis


Past Surgical History: Reports: Hx  Section - x3, Hx Hysterectomy





Physical Exam





- Vital signs


Vitals: 





                                        











Temp Pulse Resp BP Pulse Ox


 


 98.1 F   80   18   132/64 H  93 


 


 20 18:41  20 18:41  20 18:41  20 18:41  20 18:41














Course





- Vital Signs


Vital signs: 





                                        











Temp Pulse Resp BP Pulse Ox


 


 98.1 F   80   18   132/64 H  93 


 


 20 18:41  20 18:41  20 18:41  20 18:41  20 18:41














Doctor's Discharge





- Discharge


Referrals: 


UMER VAZQUEZ DO [Primary Care Provider] - Follow up as needed

## 2020-11-16 NOTE — RADIOLOGY REPORT (SQ)
EXAM DESCRIPTION:

Site:  CHEST SINGLE VIEW

RP: XR CHEST 1 VIEW 



CLINICAL HISTORY:

70 years  Female;  cough positive for covid on 11/3/20;



COMPARISON: None.



FINDINGS:

Lungs: There are scattered groundglass densities throughout both

lungs. No significant focal consolidation. No pneumothorax or

pleural effusion.

Mediastinum: Mediastinum is within normal limits for this

positioning.

Bones: Bony structures are unremarkable.



IMPRESSION:

1.  Scattered bilateral infiltrates, typical for viral pneumonia

## 2020-11-17 LAB
APPEARANCE UR: (no result)
APTT PPP: (no result) S
ARTERIAL BLOOD FIO2: (no result)
ARTERIAL BLOOD H2CO3: 1.07 MMOL/L (ref 1.05–1.35)
ARTERIAL BLOOD HCO3: 23 MMOL/L (ref 20–24)
ARTERIAL BLOOD PCO2: 35.4 MMHG (ref 35–45)
ARTERIAL BLOOD PH: 7.43 (ref 7.35–7.45)
ARTERIAL BLOOD PO2: 76.6 MMHG (ref 80–100)
ARTERIAL BLOOD TOTAL CO2: 24.1 MMOL/L (ref 21–25)
BASE EXCESS BLDA CALC-SCNC: -0.9 MMOL/L
BILIRUB UR QL STRIP: NEGATIVE
CK SERPL-CCNC: 52 U/L (ref 30–135)
CRP SERPL-MCNC: 15.2 MG/L (ref ?–10)
FERRITIN SERPL-MCNC: 154 NG/ML (ref 11.1–264)
GLUCOSE UR STRIP-MCNC: >=500 MG/DL
KETONES UR STRIP-MCNC: NEGATIVE MG/DL
NITRITE UR QL STRIP: NEGATIVE
PH UR STRIP: 5 [PH] (ref 5–9)
PROT UR STRIP-MCNC: 100 MG/DL
SAO2 % BLDA: 95.7 % (ref 94–98)
SP GR UR STRIP: 1.02
UROBILINOGEN UR-MCNC: NEGATIVE MG/DL (ref ?–2)

## 2020-11-17 RX ADMIN — FAMOTIDINE SCH MG: 20 TABLET, FILM COATED ORAL at 10:42

## 2020-11-17 RX ADMIN — ENOXAPARIN SODIUM SCH MG: 40 INJECTION SUBCUTANEOUS at 10:43

## 2020-11-17 RX ADMIN — DEXAMETHASONE SODIUM PHOSPHATE PRN MG: 10 INJECTION INTRAMUSCULAR; INTRAVENOUS at 02:29

## 2020-11-17 RX ADMIN — OXYCODONE HYDROCHLORIDE AND ACETAMINOPHEN SCH MG: 500 TABLET ORAL at 18:03

## 2020-11-17 RX ADMIN — Medication SCH ML: at 21:47

## 2020-11-17 RX ADMIN — INSULIN HUMAN SCH UNIT: 100 INJECTION, SOLUTION PARENTERAL at 13:01

## 2020-11-17 RX ADMIN — FAMOTIDINE SCH MG: 20 TABLET, FILM COATED ORAL at 21:46

## 2020-11-17 RX ADMIN — Medication SCH MG: at 10:42

## 2020-11-17 RX ADMIN — INSULIN HUMAN SCH UNIT: 100 INJECTION, SOLUTION PARENTERAL at 21:47

## 2020-11-17 RX ADMIN — DEXAMETHASONE SODIUM PHOSPHATE PRN MG: 10 INJECTION INTRAMUSCULAR; INTRAVENOUS at 04:47

## 2020-11-17 RX ADMIN — Medication SCH: at 14:46

## 2020-11-17 RX ADMIN — INSULIN HUMAN SCH UNIT: 100 INJECTION, SOLUTION PARENTERAL at 08:15

## 2020-11-17 RX ADMIN — VITAMIN D, TAB 1000IU (100/BT) SCH UNIT: 25 TAB at 10:42

## 2020-11-17 RX ADMIN — OXYCODONE HYDROCHLORIDE AND ACETAMINOPHEN SCH MG: 500 TABLET ORAL at 10:42

## 2020-11-17 RX ADMIN — INSULIN HUMAN SCH UNIT: 100 INJECTION, SOLUTION PARENTERAL at 18:03

## 2020-11-17 RX ADMIN — Medication SCH: at 06:35

## 2020-11-17 RX ADMIN — INSULIN GLARGINE SCH UNIT: 100 INJECTION, SOLUTION SUBCUTANEOUS at 10:43

## 2020-11-17 RX ADMIN — DEXAMETHASONE SODIUM PHOSPHATE PRN MG: 10 INJECTION INTRAMUSCULAR; INTRAVENOUS at 13:02

## 2020-11-17 RX ADMIN — METOPROLOL TARTRATE SCH MG: 25 TABLET, FILM COATED ORAL at 21:46

## 2020-11-17 NOTE — PDOC H&P
History of Present Illness


Admission Date/PCP: 


  20 05:19





  BA SCHULTZ





Patient complains of: Nausea and vomiting, cough


History of Present Illness: 


ELIZABETH VALENTINO is a 70 year old female with a history of type 2 diabetes on

insulin, hypertension and CAD who was diagnosed with COVID-19 about 2 weeks back

now presents with worsening of symptoms.  She states that she and her  

were diagnosed around the same time after coming in contact with her son and 

grandkids where also positive.  Her  had mild symptoms but she continues 

to have nausea and repeated vomiting.  She states that she has lost her sense of

taste and smell.  She also has a dry cough and body aches but denies any fever, 

chest pain or shortness of breath even with exertion.  She reports that she has 

been unable to eat anything over the past couple of weeks and has lost 

significant weight of about 20 pounds in the last 2 weeks.  She was given 

unspecified medication for the nausea and vomiting by her primary care doctor 

but was not helpful.  On arrival patient was saturating mid 80s on room air and 

was placed on intranasal oxygen at 2 L which improved her saturation to mid 90s.








Past Medical History


Cardiac Medical History: Reports: Hypertension


Endocrine Medical History: Reports: Diabetes Mellitus Type 2





Past Surgical History


Past Surgical History: Reports:  Section - x3, Hysterectomy





Social History


Information Source: Patient


Lives with: Family


Smoking Status: Former Smoker


Hx Recreational Drug Use: No


Drugs: None





- Advance Directive


Resuscitation Status: Full Code





Family History


Family History: Reviewed & Not Pertinent


Parental Family History Reviewed: Yes


Children Family History Reviewed: Yes


Sibling(s) Family History Reviewed.: Yes





Medication/Allergy


Home Medications: 








Ciprofloxacin HCl [Cipro 500 mg Tablet] 500 mg PO BID #20 tablet 17 


Ondansetron HCl [Zofran] 8 mg PO Q6 #14 tablet 17 


Clindamycin HCl 300 mg PO Q6 #40 capsule 17 


Diazepam [Valium 2 mg Tablet] 2 mg PO BID PRN #14 tablet 18 


Hydrocodone/Acetaminophen [Vicodin 5-300 mg Tablet] 1 each PO Q6HP PRN #20 

tablet 18 


Ketorolac Tromethamine [Toradol 10 mg Tablet] 10 mg PO Q6HP PRN #20 tablet 

18 








Allergies/Adverse Reactions: 


                                        





morphine Allergy (Verified 20 19:41)


   











Review of Systems


Constitutional: PRESENT: fatigue, weakness, weight loss


Eyes: ABSENT: visual disturbances


Ears: ABSENT: hearing changes


Nose, Mouth, and Throat: PRESENT: vertigo.  ABSENT: as per HPI, headache(s), 

mouth pain, sore throat


Cardiovascular: ABSENT: chest pain, dyspnea on exertion, edema, orthropnea, 

palpitations


Respiratory: PRESENT: as per HPI


Gastrointestinal: PRESENT: as per HPI


Genitourinary: ABSENT: dysuria, hematuria


Musculoskeletal: ABSENT: joint swelling


Integumentary: ABSENT: rash, wounds


Neurological: ABSENT: abnormal gait, abnormal speech, confusion, dizziness, 

focal weakness, syncope


Psychiatric: ABSENT: anxiety, depression, homidical ideation, suicidal ideation


Endocrine: ABSENT: cold intolerance, heat intolerance, polydipsia, polyuria


Hematologic/Lymphatic: ABSENT: easy bleeding, easy bruising





Physical Exam


Vital Signs: 


                                        











Temp Pulse Resp BP Pulse Ox


 


 98.1 F   80   22 H  148/52 H  94 


 


 20 18:41  20 18:41  20 01:30  20 01:30  20 01:30








                                 Intake & Output











 11/15/20 11/16/20 11/17/20





 06:59 06:59 06:59


 


Intake Total   1007


 


Balance   1007


 


Weight   85 kg











Additional comments: 





GENERAL APPEARANCE: Alert and oriented x3, in no acute distress, breathing 

comfortably on 2 L intranasal oxygen


HEENT: Normocephalic and atraumatic. No scleral icterus.  Moist oral mucosa


NECK: Supple. No lymphadenopathy or tenderness. No carotid bruit. No JVD


CHEST: Symmetric. Nontender to palpation.


LUNGS: Clear with good air entry bilaterally. No wheezing or crackles 


HEART: Regular rate and rhythm with normal S1 and S2. No murmurs, gallops, or 

rubs.


ABDOMEN: soft, active bowel sounds, no direct or rebound tenderness.  No 

organomegaly detected.  


EXTREMITIES: No cyanosis, clubbing, or edema.


MUSCULOSKELETAL: No deformity, atrophy or swelling noted


PSYCHIATRIC:  Recent and remote memory is intact. Appropriate mood and affect.


SKIN: Warm, dry, and well perfused. No lesions or rashes are noted. 


NEUROLOGIC: No focal sensory or motor deficits are noted. 








Results


Laboratory Results: 


                                        





                                 20 23:00 





                                 20 23:00 





                                        











  20





  23:00 23:00 00:20


 


WBC  8.3  


 


RBC  4.77  


 


Hgb  13.4  


 


Hct  39.2  


 


MCV  82  


 


MCH  28.1  


 


MCHC  34.1  


 


RDW  13.9  


 


Plt Count  324  


 


Seg Neutrophils %  68.0  


 


Carbonic Acid   


 


HCO3/H2CO3 Ratio   


 


ABG pH   


 


ABG pCO2   


 


ABG pO2   


 


ABG HCO3   


 


ABG O2 Saturation   


 


ABG Base Excess   


 


FiO2   


 


Sodium   135.9 L 


 


Potassium   3.6 


 


Chloride   99 


 


Carbon Dioxide   26 


 


Anion Gap   11 


 


BUN   16 


 


Creatinine   1.05 


 


Est GFR ( Amer)   > 60 


 


Glucose   193 H 


 


Calcium   9.4 


 


Total Bilirubin   1.2 


 


AST   17 


 


Alkaline Phosphatase   61 


 


Total Protein   6.8 


 


Albumin   3.5 


 


Urine Color    DARK YELLOW


 


Urine Appearance    CLOUDY


 


Urine pH    5.0


 


Ur Specific Gravity    1.024


 


Urine Protein    100 H


 


Urine Glucose (UA)    >=500 H


 


Urine Ketones    NEGATIVE


 


Urine Blood    NEGATIVE


 


Urine Nitrite    NEGATIVE


 


Ur Leukocyte Esterase    MODERATE H


 


Urine WBC (Auto)    156


 


Urine RBC (Auto)    6














  20





  03:30


 


WBC 


 


RBC 


 


Hgb 


 


Hct 


 


MCV 


 


MCH 


 


MCHC 


 


RDW 


 


Plt Count 


 


Seg Neutrophils % 


 


Carbonic Acid  1.07


 


HCO3/H2CO3 Ratio  21:1


 


ABG pH  7.43


 


ABG pCO2  35.4


 


ABG pO2  76.6 L


 


ABG HCO3  23.0


 


ABG O2 Saturation  95.7


 


ABG Base Excess  -0.9


 


FiO2  2L


 


Sodium 


 


Potassium 


 


Chloride 


 


Carbon Dioxide 


 


Anion Gap 


 


BUN 


 


Creatinine 


 


Est GFR (African Amer) 


 


Glucose 


 


Calcium 


 


Total Bilirubin 


 


AST 


 


Alkaline Phosphatase 


 


Total Protein 


 


Albumin 


 


Urine Color 


 


Urine Appearance 


 


Urine pH 


 


Ur Specific Gravity 


 


Urine Protein 


 


Urine Glucose (UA) 


 


Urine Ketones 


 


Urine Blood 


 


Urine Nitrite 


 


Ur Leukocyte Esterase 


 


Urine WBC (Auto) 


 


Urine RBC (Auto) 








                                        











  20





  23:00


 


Troponin I  < 0.012











Impressions: 


                                        





Chest X-Ray  20 19:44


IMPRESSION:


1.  Scattered bilateral infiltrates, typical for viral pneumonia


 














Assessment and Plan





- Diagnosis


(1) Acute respiratory failure with hypoxia


Is this a current diagnosis for this admission?: Yes   


Plan: 


Patient was diagnosed with COVID-19 two weeks back


Denies shortness of breath but oxygen saturation on arrival was mid 80s


ABG: pH/PCO2/PO2=> 7.4 3/35/76 while on 2 L oxygen


Chest x-ray shows scattered bilateral infiltrates suggestive of viral pneumonia


Continue supplemental oxygen through intranasal cannula to keep saturation above

94%


Started on dexamethasone, vitamin D, vitamin C, zinc


Zofran 4 mg every 6 hourly as needed IV for nausea and vomiting


Consider remdesivir and convalescent plasma


Currently doing well on intranasal oxygen, will continue to closely monitor her 

respiratory parameters








(2) Pneumonia due to COVID-19 virus


Is this a current diagnosis for this admission?: Yes   


Plan: 


Patient diagnosed with COVID-19 2 weeks back


Now presents with dry cough and intractable nausea and vomiting


Chest x-ray shows bilateral scattered infiltrates concerning for viral pneumonia


Continue intranasal oxygen, dexamethasone, vitamin D, zinc, vitamin C








(3) Type 2 diabetes mellitus with hyperglycemia


Qualifiers: 


   Diabetes mellitus long term insulin use: with long term use   Qualified Co

de(s): E11.65 - Type 2 diabetes mellitus with hyperglycemia; Z79.4 - Long term 

(current) use of insulin   


Is this a current diagnosis for this admission?: Yes   


Plan: 


Started on Lantus 20 units daily


Sliding scale insulin, Accu-Chek, hypoglycemia protocol








(4) Hypertension


Is this a current diagnosis for this admission?: Yes   


Plan: 


Stable and BP at target


We will hold hypertensive medication for now due to double nausea vomiting and 

possible volume depletion








(5) CAD (coronary artery disease)


Is this a current diagnosis for this admission?: Yes   


Plan: 


Currently chest pain-free


Continue home medications








(6) Obesity (BMI 30.0-34.9)


Is this a current diagnosis for this admission?: Yes   





- Time


Time Spent with patient: 35 or more minutes


Total Critical Time (Minutes): 45


Medications reviewed and adjusted accordingly: Yes


Anticipated Discharge Disposition: Home, Self Care


Anticipated Discharge Timeframe: within 48 hours





- Inpatient Certification


Based on my medical assessment, after consideration of the patient's 

comorbidities, presenting symptoms, or acuity I expect that the services needed 

warrant INPATIENT care.: Yes


I certify that my determination is in accordance with my understanding of 

Medicare's requirements for reasonable and necessary INPATIENT services [42 CFR 

412.3e].: Yes


Medical Necessity: Failure to Improve With Outpatient Therapy, Need Close 

Monitoring Due to Risk of Patient Decompensation, Risk of Complication if Not 

Cared For in Hospital


Post Hospital Care: D/C or Transfer Summary

## 2020-11-17 NOTE — EKG REPORT
SEVERITY:- ABNORMAL ECG -

SINUS RHYTHM

FIRST DEGREE AV BLOCK

CONSIDER ANTERIOR INFARCT

NONSPECIFIC T ABNORMALITIES, DIFFUSE LEADS

BORDERLINE PROLONGED QT INTERVAL

:

Confirmed by: Holli Woods MD 17-Nov-2020 15:20:13

## 2020-11-17 NOTE — PROGRESS NOTE
Provider Note


Provider Note: 


ELIZABETH VALENTINO is a 70 year old female with a history of type 2 diabetes on

insulin, hypertension and CAD who was admitted by the [facility] early this 

morning for COVID 19 w/ intractable nausea and vomiting.





Nursing notes, vital signs, laboratory results, imaging, H&P, and orders 

reviewed.  Agree with plan of care as established by the previous provider.


Patient was briefly seen on morning rounds.  She has no new questions or 

concerns.  No significant changes to clinical status from what was documented in

H&P.





In addition, the patient's home medications have been reconciled and resumed.


As the patient is maintaining oxygen saturations on room air and appears 

clinically stable, will transition from IV dexamethasone to oral in anticipation

of a quick turnaround to discharge.


We will encourage pulmonary toilet with incentive spirometer and flutter valve.


The patient states that she lives alone; we will asked discharge planning to 

evaluate.  Patient will likely benefit from a home health nursing, aide, and 

.

## 2020-11-17 NOTE — ER DOCUMENT REPORT
ED General





- General


Chief Complaint: Nausea/Vomiting


Stated Complaint: NAUSEA/VOMITING


Time Seen by Provider: 20 19:43


Mode of Arrival: Wheelchair


Notes: 


Patient is a 70-year-old female that comes emergency department for chief 

complaint of cough, weakness, dry heaving.  Patient was tested for COVID-19 on 

November 3 and she was positive, she states that for the past 2 weeks she has 

been extremely rundown.  She states that her daughter came over today and 

noticed that she did not look well and told her to come to the hospital to be 

evaluated.  Patient denies shortness of breath, chest pain, she states she dry 

heaves today but did not vomit specifically, denies abnormal bowel movements.  

She denies sore throat, headache.  She does state that she is extremely weak and

this is her chief complaint at this time.  She has a past medical history of 

type 2 diabetes, hypertension, hyperlipidemia, CAD with stents.  She denies 

smoking history, COPD, asthma.











TRAVEL OUTSIDE OF THE [place] IN LAST 30 DAYS: No





- Related Data


Allergies/Adverse Reactions: 


                                        





morphine Allergy (Verified 20 19:41)


   











Past Medical History





- General


Information source: Patient





- Social History


Smoking Status: Former Smoker


Frequency of alcohol use: None


Drug Abuse: None


Lives with: Alone


Family History: Reviewed & Not Pertinent





- Past Medical History


Cardiac Medical History: Reports: Hx Coronary Artery Disease, Hx Hypertension


Endocrine Medical History: Reports: Hx Diabetes Mellitus Type 2


Renal/ Medical History: Denies: Hx Peritoneal Dialysis


Past Surgical History: Reports: Hx Cardiac Catheterization - With stent, Hx 

 Section - x3, Hx Hysterectomy





Review of Systems





- Review of Systems


Constitutional: See HPI


EENT: No symptoms reported


Cardiovascular: No symptoms reported


Respiratory: No symptoms reported


Gastrointestinal: See HPI


Genitourinary: No symptoms reported


Female Genitourinary: No symptoms reported


Musculoskeletal: No symptoms reported


Skin: No symptoms reported


Hematologic/Lymphatic: No symptoms reported


Neurological/Psychological: No symptoms reported





Physical Exam





- Vital signs


Vitals: 


                                        











Temp Pulse Resp BP Pulse Ox


 


 98.1 F   80   18   132/64 H  93 


 


 20 18:41  20 18:41  20 18:41  20 18:41  20 18:41














- Notes


Notes: 





GENERAL: Patient is awake and alert, she is interactive, however she is tired 

and somewhat ill-appearing


HEAD: Normocephalic, atraumatic.


EYES: Pupils equal, round, and reactive to light. Extraocular movements intact.


ENT: Oral mucosa dry, tongue midline. Oropharynx unremarkable. Airway patent. 


NECK: Full range of motion. Supple. Trachea midline. No lymphadenopathy.  No 

nuchal rigidity


LUNGS: Scattered rales noted throughout both general lung fields.  Speaks in 

full sentences.  No tachypnea or respiratory distress.  Occasional congested 

cough.


HEART: Regular rate and rhythm. No murmur


ABDOMEN: Soft, non-tender. Non-distended. 


EXTREMITIES: Moves all 4 extremities spontaneously. No edema, normal radial and 

dorsalis pedis pulses bilaterally. No cyanosis.


BACK: no cervical, thoracic, lumbar midline tenderness. No saddle anesthesia, 

normal distal neurovascular exam. Moves all extremities in full range of motion.


NEUROLOGICAL: Alert and oriented x3. Normal speech. Cranial nerves II through 

XII grossly intact. Strength 5/5 in all extremities. 


PSYCH: Normal affect, normal mood.


SKIN: Warm, dry, normal turgor. No rashes or lesions noted.





Course





- Re-evaluation


Re-evalutation: 


Patient is hypoxic on room air and was placed on 2 L.  She has rales on 

evaluation of her lungs in both lung fields although she is not in respiratory 

distress.  Her abdomen is soft and benign.  Her physical examination is 

otherwise unremarkable.  She is not hypotensive or tachycardic.





CBC unremarkable, chemistry unremarkable, troponin is negative.  Chest x-ray sh

owing viral pneumonia bilaterally.  There is a long delay in obtaining ABG, this

is somewhat nonspecific.  Patient still noted to be hypoxic at rest.  Patient 

stating she would like to go home, we obtained urine, this was noted to be 

infected, I have already initiated treatment for suspected Covid pneumonia with 

dexamethasone, Rocephin, azithromycin.  Cultures pending.  I had recommended 

admission.  Patient was still unsure, she attempted to ambulate with pulse 

oxygen saturation, she had very labored breathing, was barely able to ambulate, 

and had hypoxia into the upper 80s.  She is 88% at rest without nasal cannula.  

At this point patient change her mind, states she is in full agreement with 

admission.  Will discuss with hospitalist.





Discussed with Dr. Gonzalez, patient accepted to medical floor full admission.





- Vital Signs


Vital signs: 


                                        











Temp Pulse Resp BP Pulse Ox


 


 98.1 F   80   22 H  148/52 H  94 


 


 20 18:41  20 18:41  20 01:30  20 01:30  20 01:30














- Laboratory


Result Diagrams: 


                                 20 23:00





                                 20 23:00


Laboratory results interpreted by me: 


                                        











  20





  23:00 00:20 03:30


 


ABG pO2    76.6 L


 


Sodium  135.9 L  


 


Est GFR (MDRD) Non-Af  52 L  


 


Glucose  193 H  


 


Urine Protein   100 H 


 


Urine Glucose (UA)   >=500 H 


 


Ur Leukocyte Esterase   MODERATE H 














- EKG Interpretation by Me


Additional EKG results interpreted by me: 


EKG shows sinus rhythm at a rate of 64, first-degree AV block with NH interval 

of 224, QTc slightly prolonged at 508, borderline T wave inversions in inferior 

leads, no ST segment changes in consecutive leads





Discharge





- Discharge


Clinical Impression: 


 Pneumonia due to COVID-19 virus, Hypoxia, Weakness





Urinary tract infection


Qualifiers:


 Urinary tract infection type: site unspecified Hematuria presence: without 

hematuria Qualified Code(s): N39.0 - Urinary tract infection, site not specified





Condition: Stable


Disposition: ADMITTED AS INPATIENT


Admitting Provider: 


Unit Admitted: Medical Floor

## 2020-11-18 VITALS — SYSTOLIC BLOOD PRESSURE: 139 MMHG | DIASTOLIC BLOOD PRESSURE: 44 MMHG

## 2020-11-18 LAB
ANION GAP SERPL CALC-SCNC: 12 MMOL/L (ref 5–19)
BUN SERPL-MCNC: 21 MG/DL (ref 7–20)
CALCIUM: 9.3 MG/DL (ref 8.4–10.2)
CHLORIDE SERPL-SCNC: 104 MMOL/L (ref 98–107)
CO2 SERPL-SCNC: 20 MMOL/L (ref 22–30)
ERYTHROCYTE [DISTWIDTH] IN BLOOD BY AUTOMATED COUNT: 13.9 % (ref 11.5–14)
GLUCOSE SERPL-MCNC: 336 MG/DL (ref 75–110)
HCT VFR BLD CALC: 36.1 % (ref 36–47)
HGB BLD-MCNC: 12.1 G/DL (ref 12–15.5)
MCH RBC QN AUTO: 27.5 PG (ref 27–33.4)
MCHC RBC AUTO-ENTMCNC: 33.5 G/DL (ref 32–36)
MCV RBC AUTO: 82 FL (ref 80–97)
PLATELET # BLD: 274 10^3/UL (ref 150–450)
POTASSIUM SERPL-SCNC: 4 MMOL/L (ref 3.6–5)
RBC # BLD AUTO: 4.4 10^6/UL (ref 3.72–5.28)
WBC # BLD AUTO: 9.8 10^3/UL (ref 4–10.5)

## 2020-11-18 RX ADMIN — Medication SCH MG: at 10:17

## 2020-11-18 RX ADMIN — Medication SCH ML: at 06:26

## 2020-11-18 RX ADMIN — INSULIN HUMAN SCH UNIT: 100 INJECTION, SOLUTION PARENTERAL at 08:25

## 2020-11-18 RX ADMIN — OXYCODONE HYDROCHLORIDE AND ACETAMINOPHEN SCH MG: 500 TABLET ORAL at 10:17

## 2020-11-18 RX ADMIN — Medication SCH ML: at 13:44

## 2020-11-18 RX ADMIN — INSULIN HUMAN SCH UNIT: 100 INJECTION, SOLUTION PARENTERAL at 12:11

## 2020-11-18 RX ADMIN — FAMOTIDINE SCH MG: 20 TABLET, FILM COATED ORAL at 10:17

## 2020-11-18 RX ADMIN — INSULIN GLARGINE SCH UNIT: 100 INJECTION, SOLUTION SUBCUTANEOUS at 10:22

## 2020-11-18 RX ADMIN — METOPROLOL TARTRATE SCH: 25 TABLET, FILM COATED ORAL at 10:19

## 2020-11-18 RX ADMIN — VITAMIN D, TAB 1000IU (100/BT) SCH UNIT: 25 TAB at 10:15

## 2020-11-18 RX ADMIN — ENOXAPARIN SODIUM SCH MG: 40 INJECTION SUBCUTANEOUS at 10:19

## 2020-11-18 NOTE — PDOC DISCHARGE SUMMARY
Impression





- Admit/DC Date/PCP


Admission Date/Primary Care Provider: 


  11/17/20 05:19





  BA SCHULTZ





Discharge Date: 11/18/20





- Discharge Diagnosis


(1) Acute respiratory failure with hypoxia


Is this a current diagnosis for this admission?: Yes   





(2) CAD (coronary artery disease)


Is this a current diagnosis for this admission?: Yes   





(3) Hypertension


Is this a current diagnosis for this admission?: Yes   





(4) Obesity (BMI 30.0-34.9)


Is this a current diagnosis for this admission?: Yes   





(5) Pneumonia due to COVID-19 virus


Is this a current diagnosis for this admission?: Yes   





(6) Type 2 diabetes mellitus with hyperglycemia


Is this a current diagnosis for this admission?: Yes   





- Additional Information


Resuscitation Status: Full Code


Discharge Diet: Diabetic


Discharge Activity: Activity As Tolerated, Balance Activity w/Rest, Slowly 

Increase Activity


[facility]errals: 


[facility] - 11/30/20 8:30 am (Dr. Andrade)


Prescriptions: 


Albuterol Sulfate [Albuterol Sulfate Hfa] 2 puff IH Q4HP PRN #1 hfa.aer.ad


 PRN Reason: 


Guaifenesin [Mucus Relief ER] 600 mg PO Q12 #14 tab.er.12h


Ascorbic Acid [Vitamin C 500 mg Tablet] 500 mg PO BID #60 tablet


Cholecalciferol (Vitamin D3) [Vitamin D3 1000 Unit Tablet] 1,000 unit PO DAILY 

#60 tablet


Zinc Sulfate [Zinc-220 Capsule] 220 mg PO DAILY #30 capsule


Home Medications: 








Atorvastatin Calcium [Lipitor 80 mg Tablet] 80 mg PO QHS 11/17/20 


Dapagliflozin Propanediol [Farxiga] 10 mg PO DAILY 11/17/20 


Glipizide [Glipizide Xl] 2.5 mg PO BIDBS 11/17/20 


Insulin NPH Human Isophane [Novolin N Flexpen] 20 unit SUBCUT QPM 11/17/20 


Insulin NPH Human Isophane [Novolin N Flexpen] 40 unit SQ QAM 11/17/20 


Levothyroxine Sodium 137 mcg PO Q6AM 11/17/20 


Lisinopril [Prinivil 10 mg Tablet] 30 mg PO DAILY 11/17/20 


Metformin HCl [Metformin HCl ER] 750 mg PO BID 11/17/20 


Metoprolol Tartrate [Lopressor 25 mg Tablet] 25 mg PO Q12 11/17/20 


Albuterol Sulfate [Albuterol Sulfate Hfa] 2 puff IH Q4HP PRN #1 hfa.aer.ad 

11/18/20 


Ascorbic Acid [Vitamin C 500 mg Tablet] 500 mg PO BID #60 tablet 11/18/20 


Cholecalciferol (Vitamin D3) [Vitamin D3 1000 Unit Tablet] 1,000 unit PO DAILY 

#60 tablet 11/18/20 


Guaifenesin [Mucus Relief ER] 600 mg PO Q12 #14 tab.er.12h 11/18/20 


Zinc Sulfate [Zinc-220 Capsule] 220 mg PO DAILY #30 capsule 11/18/20 











History of Present Illiness


History of Present Illness: 


Per H&P by Dr. Gonzalez: ELIZABETH VALENTINO is a 70 year old female with a 

history of type 2 diabetes on insulin, hypertension and CAD who was diagnosed 

with COVID-19 about 2 weeks back now presents with worsening of symptoms.  She s

tates that she and her  were diagnosed around the same time after coming 

in contact with her son and grandkids where also positive.  Her  had mild

symptoms but she continues to have nausea and repeated vomiting.  She states 

that she has lost her sense of taste and smell.  She also has a dry cough and 

body aches but denies any fever, chest pain or shortness of breath even with 

exertion.  She reports that she has been unable to eat anything over the past 

couple of weeks and has lost significant weight of about 20 pounds in the last 2

weeks.  She was given unspecified medication for the nausea and vomiting by her 

primary care doctor but was not helpful.  On arrival patient was saturating mid 

80s on room air and was placed on intranasal oxygen at 2 L which improved her 

saturation to mid 90s.





Hospital Course


Hospital Course: 


The patient presented to the emergency department after 2 weeks of generalized 

fatigue, decreased p.o. intake, nausea and emesis.  


She tested positive for COVID-19 as outpatient approximately 2 weeks ago.  As 

the patient's diagnosis is >10 days pta and she does not evidence hypoxia she 

was not placed on azithromycin or remdesivir.


Imaging did show scattered bilateral infiltrates suggestive of viral pneumonia, 

however, she continues to maintain oxygen saturations while on room air and did 

well during ambulatory oxygen qualification today.  


She was provided IV fluids for correction of dehydration related to decreased 

p.o. intake and nausea/vomiting.  Antiemtics were made available.  She is now 

tolerating 100% of a regular consistency/consistent carb diet without increased 

nausea or vomiting.





Patient states that she is feeling significantly better and is comfortable with 

plan to discharge home with home health services.


She is advised to drink plenty of fluids, eat as tolerated, and rest.


We discussed the prolonged recover related to COVID.


She was provided Rx for zofran odt, zinc, vit d, vit c, mucinex, and albuterol 

hfa.


She was advised to return to the emergency department, as needed, for concerning

symptoms.





Physical Exam


Vital Signs: 


                                        











Temp Pulse Resp BP Pulse Ox


 


 98.7 F   67   21 H  139/44 H  96 


 


 11/18/20 14:54  11/18/20 14:54  11/18/20 14:54  11/18/20 14:54  11/18/20 14:54








                                 Intake & Output











 11/17/20 11/18/20 11/19/20





 06:59 06:59 06:59


 


Intake Total 1050 240 260


 


Balance 1050 240 260


 


Weight 85 kg 80.6 kg 











General appearance: PRESENT: no acute distress, cooperative, obese, well-

developed, well-nourished


Head exam: PRESENT: atraumatic, normocephalic


Eye exam: PRESENT: conjunctiva pink, EOMI, PERRLA.  ABSENT: scleral icterus


Mouth exam: PRESENT: moist, tongue midline


Respiratory exam: PRESENT: clear to auscultation matt, symmetrical, unlabored.  

ABSENT: rales, rhonchi, wheezes


Cardiovascular exam: PRESENT: RRR.  ABSENT: diastolic murmur, rubs, systolic 

murmur


Vascular exam: PRESENT: normal capillary refill


GI/Abdominal exam: PRESENT: normal bowel sounds, soft.  ABSENT: distended, 

guarding, mass, organolmegaly, rebound, tenderness


Rectal exam: PRESENT: deferred


Extremities exam: PRESENT: full ROM.  ABSENT: calf tenderness, clubbing, pedal 

edema


Musculoskeletal exam: PRESENT: ambulatory - On room air


Neurological exam: PRESENT: alert, awake, oriented to person, oriented to place,

oriented to time, oriented to situation, CN II-XII grossly intact.  ABSENT: 

motor sensory deficit


Psychiatric exam: PRESENT: appropriate affect, normal mood.  ABSENT: homicidal 

ideation, suicidal ideation


Skin exam: PRESENT: dry, intact, warm.  ABSENT: cyanosis, rash





Results


Laboratory Results: 


                                        











WBC  9.8 10^3/uL (4.0-10.5)   11/18/20  05:17    


 


RBC  4.40 10^6/uL (3.72-5.28)   11/18/20  05:17    


 


Hgb  12.1 g/dL (12.0-15.5)   11/18/20  05:17    


 


Hct  36.1 % (36.0-47.0)   11/18/20  05:17    


 


MCV  82 fl (80-97)   11/18/20  05:17    


 


MCH  27.5 pg (27.0-33.4)   11/18/20  05:17    


 


MCHC  33.5 g/dL (32.0-36.0)   11/18/20  05:17    


 


RDW  13.9 % (11.5-14.0)   11/18/20  05:17    


 


Plt Count  274 10^3/uL (150-450)   11/18/20  05:17    


 


Lymph % (Auto)  22.9 % (13-45)   11/16/20  23:00    


 


Mono % (Auto)  7.6 % (3-13)   11/16/20  23:00    


 


Eos % (Auto)  1.4 % (0-6)   11/16/20  23:00    


 


Baso % (Auto)  0.1 % (0-2)   11/16/20  23:00    


 


Absolute Neuts (auto)  5.6 10^3/uL (1.7-8.2)   11/16/20  23:00    


 


Absolute Lymphs (auto)  1.9 10^3/uL (0.5-4.7)   11/16/20  23:00    


 


Absolute Monos (auto)  0.6 10^3/uL (0.1-1.4)   11/16/20  23:00    


 


Absolute Eos (auto)  0.1 10^3/uL (0.0-0.6)   11/16/20  23:00    


 


Absolute Basos (auto)  0.0 10^3/uL (0.0-0.2)   11/16/20  23:00    


 


Seg Neutrophils %  68.0 % (42-78)   11/16/20  23:00    


 


D-Dimer  0.81 ug/mL (0.00-0.50)  H  11/16/20  23:00    


 


Carbonic Acid  1.07 mmol/L (1.05-1.35)   11/17/20  03:30    


 


HCO3/H2CO3 Ratio  21:1   11/17/20  03:30    


 


ABG pH  7.43  (7.35-7.45)   11/17/20  03:30    


 


ABG pCO2  35.4 mmHg (35-45)   11/17/20  03:30    


 


ABG pO2  76.6 mmHg ()  L  11/17/20  03:30    


 


ABG HCO3  23.0 mmol/L (20-24)   11/17/20  03:30    


 


ABG Total CO2  24.1 mmol/L (21-25)   11/17/20  03:30    


 


ABG O2 Saturation  95.7 % (94-98)   11/17/20  03:30    


 


ABG Base Excess  -0.9 mmol/L  11/17/20  03:30    


 


FiO2  2L   11/17/20  03:30    


 


Sodium  136.1 mmol/L (137-145)  L  11/18/20  05:17    


 


Potassium  4.0 mmol/L (3.6-5.0)   11/18/20  05:17    


 


Chloride  104 mmol/L ()   11/18/20  05:17    


 


Carbon Dioxide  20 mmol/L (22-30)  L  11/18/20  05:17    


 


Anion Gap  12  (5-19)   11/18/20  05:17    


 


BUN  21 mg/dL (7-20)  H  11/18/20  05:17    


 


Creatinine  0.73 mg/dL (0.52-1.25)   11/18/20  05:17    


 


Est GFR ( Amer)  > 60  (>60)   11/18/20  05:17    


 


Est GFR (MDRD) Non-Af  > 60  (>60)   11/18/20  05:17    


 


Glucose  336 mg/dL ()  H  11/18/20  05:17    


 


POC Glucose  278 mg/dL ()  H  11/18/20  11:20    


 


Calcium  9.3 mg/dL (8.4-10.2)   11/18/20  05:17    


 


Ferritin  154.00 ng/mL (11.1-264.0)   11/17/20  06:07    


 


Total Bilirubin  1.2 mg/dL (0.2-1.3)   11/16/20  23:00    


 


Direct Bilirubin  0.0 mg/dL (0.0-0.4)   11/16/20  23:00    


 


Neonat Total Bilirubin  Not Reportable   11/16/20  23:00    


 


Neonat Direct Bilirubin  Not Reportable   11/16/20  23:00    


 


Neonat Indirect Bili  Not Reportable   11/16/20  23:00    


 


AST  17 U/L (14-36)   11/16/20  23:00    


 


ALT  12 U/L (<35)   11/16/20  23:00    


 


Alkaline Phosphatase  61 U/L ()   11/16/20  23:00    


 


Lactate Dehydrogenase  255 U/L (120-246)  H  11/17/20  06:07    


 


Creatine Kinase  52 U/L ()   11/17/20  06:07    


 


Troponin I  < 0.012 ng/mL  11/16/20  23:00    


 


C-Reactive Protein  15.2 mg/L (<10.0)  H  11/17/20  06:07    


 


Total Protein  6.8 g/dL (6.3-8.2)   11/16/20  23:00    


 


Albumin  3.5 g/dL (3.5-5.0)   11/16/20  23:00    


 


Urine Color  DARK YELLOW   11/17/20  00:20    


 


Urine Appearance  CLOUDY   11/17/20  00:20    


 


Urine pH  5.0  (5.0-9.0)   11/17/20  00:20    


 


Ur Specific Gravity  1.024   11/17/20  00:20    


 


Urine Protein  100 mg/dL (NEGATIVE)  H  11/17/20  00:20    


 


Urine Glucose (UA)  >=500 mg/dL (NEGATIVE)  H  11/17/20  00:20    


 


Urine Ketones  NEGATIVE mg/dL (NEGATIVE)   11/17/20  00:20    


 


Urine Blood  NEGATIVE  (NEGATIVE)   11/17/20  00:20    


 


Urine Nitrite  NEGATIVE  (NEGATIVE)   11/17/20  00:20    


 


Urine Bilirubin  NEGATIVE  (NEGATIVE)   11/17/20  00:20    


 


Urine Urobilinogen  NEGATIVE mg/dL (<2.0)   11/17/20  00:20    


 


Ur Leukocyte Esterase  MODERATE  (NEGATIVE)  H  11/17/20  00:20    


 


Urine WBC (Auto)  156 /HPF  11/17/20  00:20    


 


Urine RBC (Auto)  6 /HPF  11/17/20  00:20    


 


U Hyaline Cast (Auto)  32 /LPF  11/17/20  00:20    


 


Urine Bacteria (Auto)  3+ /HPF  11/17/20  00:20    


 


Squamous Epi Cells Auto  11 /HPF  11/17/20  00:20    


 


U Non-Squamous Epis Auto  1 /HPF  11/17/20  00:20    


 


Urine Mucus (Auto)  MANY /LPF  11/17/20  00:20    


 


Urine Yeast (Budding)  PRESENT /HPF  11/17/20  00:20    


 


Urine Ascorbic Acid  NEGATIVE  (NEGATIVE)   11/17/20  00:20    








                                        











  11/16/20





  23:00


 


Troponin I  < 0.012











Impressions: 


                                        





Chest X-Ray  11/16/20 19:44


IMPRESSION:


1.  Scattered bilateral infiltrates, typical for viral pneumonia


 














Plan


Plan of Treatment: 


Patient is discharged home, in stable condition, with home health nursing 

services.


She is advised to follow-up with her primary care provider within 1 week.


Drink plenty of fluids, eat as tolerated.


Take your medications as prescribed.


Rest.


Return to the emergency department, as needed, for concerning symptoms.


Time Spent: Greater than 30 Minutes





Stroke


Is this a Stroke Patient?: No





Acute Heart Failure


Is this a Heart Failure Patient?: No